# Patient Record
Sex: MALE | Race: WHITE | Employment: FULL TIME | URBAN - METROPOLITAN AREA
[De-identification: names, ages, dates, MRNs, and addresses within clinical notes are randomized per-mention and may not be internally consistent; named-entity substitution may affect disease eponyms.]

---

## 2017-04-03 ENCOUNTER — HOSPITAL ENCOUNTER (EMERGENCY)
Facility: HOSPITAL | Age: 57
Discharge: HOME | End: 2017-04-03
Attending: EMERGENCY MEDICINE
Payer: COMMERCIAL

## 2017-04-03 VITALS
DIASTOLIC BLOOD PRESSURE: 92 MMHG | HEIGHT: 70 IN | WEIGHT: 170 LBS | RESPIRATION RATE: 20 BRPM | TEMPERATURE: 97 F | BODY MASS INDEX: 24.34 KG/M2 | HEART RATE: 70 BPM | SYSTOLIC BLOOD PRESSURE: 131 MMHG | OXYGEN SATURATION: 97 %

## 2017-04-03 DIAGNOSIS — M62.838 TRAPEZIUS MUSCLE SPASM: Primary | ICD-10-CM

## 2017-04-03 PROCEDURE — 80048 BASIC METABOLIC PNL TOTAL CA: CPT

## 2017-04-03 PROCEDURE — 99283 EMERGENCY DEPT VISIT LOW MDM: CPT

## 2017-04-03 PROCEDURE — 93005 ELECTROCARDIOGRAM TRACING: CPT

## 2017-04-03 PROCEDURE — 93010 ELECTROCARDIOGRAM REPORT: CPT | Performed by: EMERGENCY MEDICINE

## 2017-04-03 PROCEDURE — 85025 COMPLETE CBC W/AUTO DIFF WBC: CPT

## 2017-04-03 PROCEDURE — 36415 COLL VENOUS BLD VENIPUNCTURE: CPT

## 2017-04-03 RX ORDER — DIAZEPAM 5 MG/1
5 TABLET ORAL ONCE
Status: COMPLETED | OUTPATIENT
Start: 2017-04-03 | End: 2017-04-03

## 2017-04-03 RX ORDER — LIDOCAINE 50 MG/G
1 PATCH TOPICAL EVERY 24 HOURS
Qty: 7 PATCH | Refills: 0 | Status: SHIPPED | OUTPATIENT
Start: 2017-04-03 | End: 2017-04-10

## 2017-04-03 RX ORDER — LIDOCAINE 50 MG/G
1 PATCH TOPICAL ONCE
Status: DISCONTINUED | OUTPATIENT
Start: 2017-04-03 | End: 2017-04-03

## 2017-04-03 RX ORDER — DIAZEPAM 5 MG/1
5 TABLET ORAL EVERY 8 HOURS PRN
Qty: 15 TABLET | Refills: 0 | Status: SHIPPED | OUTPATIENT
Start: 2017-04-03 | End: 2017-04-08

## 2017-04-03 NOTE — ED PROVIDER NOTES
Patient Seen in: [facility] AND [facility] Emergency Department    History   Patient presents with:  Shoulder Pain  Swelling Edema (cardiovascular, metabolic)    Stated Complaint: right shoulder/neck pain     HPI    63 yo M without PMH presenting for evaluation of air)       Current:/92 mmHg  Pulse 70  Temp(Src) 97.1 °F (36.2 °C)  Resp 20  Ht 177.8 cm (5' 10\")  Wt 77.111 kg  BMI 24.39 kg/m2  SpO2 97%        Physical Exam   Constitutional: No distress. HEENT: MMM. Head: Normocephalic. Neck: Neck supple.  P Impression:  Trapezius muscle spasm  (primary encounter diagnosis)    Disposition:  Discharge    Follow-up:  Clarisse Gómez MD  [address] [place] [id number] [phone]    Schedule an appointment as soon as possible for a visit  For followup an

## 2017-04-03 NOTE — ED NOTES
All discharge instructions including discharge meds and follow up reviewed with patient. Verbalized understanding. . Patient ambulated out of ED in no apparent distress.

## 2017-04-03 NOTE — ED INITIAL ASSESSMENT (HPI)
Patient c/o right sided shoulder and neck pain x 2 weeks, right arm swelling that started today, denies any injury.

## 2018-01-08 ENCOUNTER — HOSPITAL ENCOUNTER (OUTPATIENT)
Age: 58
Discharge: HOME | End: 2018-01-08
Attending: PEDIATRICS
Payer: COMMERCIAL

## 2018-01-08 VITALS
TEMPERATURE: 98 F | RESPIRATION RATE: 16 BRPM | SYSTOLIC BLOOD PRESSURE: 136 MMHG | BODY MASS INDEX: 24.44 KG/M2 | HEART RATE: 76 BPM | OXYGEN SATURATION: 94 % | HEIGHT: 69 IN | WEIGHT: 165 LBS | DIASTOLIC BLOOD PRESSURE: 81 MMHG

## 2018-01-08 DIAGNOSIS — J32.9 SINUSITIS, UNSPECIFIED CHRONICITY, UNSPECIFIED LOCATION: ICD-10-CM

## 2018-01-08 DIAGNOSIS — F17.200 SMOKER: ICD-10-CM

## 2018-01-08 DIAGNOSIS — G44.209 TENSION HEADACHE: Primary | ICD-10-CM

## 2018-01-08 PROCEDURE — 99214 OFFICE O/P EST MOD 30 MIN: CPT

## 2018-01-08 PROCEDURE — 99213 OFFICE O/P EST LOW 20 MIN: CPT

## 2018-01-08 RX ORDER — TRAMADOL HYDROCHLORIDE 50 MG/1
TABLET ORAL EVERY 4 HOURS PRN
Qty: 20 TABLET | Refills: 0 | Status: SHIPPED | OUTPATIENT
Start: 2018-01-08 | End: 2018-01-15

## 2018-01-08 RX ORDER — AMOXICILLIN AND CLAVULANATE POTASSIUM 875; 125 MG/1; MG/1
1 TABLET, FILM COATED ORAL 2 TIMES DAILY
Qty: 20 TABLET | Refills: 0 | Status: SHIPPED | OUTPATIENT
Start: 2018-01-08 | End: 2018-01-18

## 2018-01-08 NOTE — ED PROVIDER NOTES
Patient presents with:  Headache (neurologic)      HPI:     Bishop Lovelace is a 62year old male who presents for evaluation of a chief complaint of headache for 3 days.   Patient is a heavy smoker and has had some congestion and cough over the past week 4 (four) hours as needed for Pain. Dispense:  20 tablet          Refill:  0    Labs performed this visit:  No results found for this or any previous visit (from the past 10 hour(s)). Diagnosis:    ICD-10-CM    1. Tension headache G44.209    2.

## 2018-01-12 ENCOUNTER — OFFICE VISIT (OUTPATIENT)
Dept: INTERNAL MEDICINE CLINIC | Facility: CLINIC | Age: 58
End: 2018-01-12

## 2018-01-12 VITALS
HEIGHT: 70 IN | TEMPERATURE: 97 F | BODY MASS INDEX: 23.62 KG/M2 | DIASTOLIC BLOOD PRESSURE: 73 MMHG | HEART RATE: 68 BPM | SYSTOLIC BLOOD PRESSURE: 108 MMHG | WEIGHT: 165 LBS

## 2018-01-12 DIAGNOSIS — F17.200 TOBACCO DEPENDENCE: ICD-10-CM

## 2018-01-12 DIAGNOSIS — G44.029 CHRONIC CLUSTER HEADACHE, NOT INTRACTABLE: ICD-10-CM

## 2018-01-12 DIAGNOSIS — J01.10 ACUTE FRONTAL SINUSITIS, RECURRENCE NOT SPECIFIED: ICD-10-CM

## 2018-01-12 DIAGNOSIS — G44.89 OTHER HEADACHE SYNDROME: Primary | ICD-10-CM

## 2018-01-12 PROCEDURE — 99212 OFFICE O/P EST SF 10 MIN: CPT | Performed by: INTERNAL MEDICINE

## 2018-01-12 PROCEDURE — 99214 OFFICE O/P EST MOD 30 MIN: CPT | Performed by: INTERNAL MEDICINE

## 2018-01-12 NOTE — PATIENT INSTRUCTIONS
Migraines and Cluster Headaches  Migraines and cluster headaches cause intense, throbbing pain on one side of the head. With a migraine, you may have nausea and vomiting and be sensitive to light and sound.  You may also have warning signs, such as flashi © 9666-6418 [address]. [address]7 [facility] [place], [facility] [place]. All rights reserved. This information is not intended as a substitute for professional medical care. Always follow your healthcare professional's instructions.

## 2018-01-12 NOTE — PROGRESS NOTES
Minh Brunner is a 62year old male.   Patient presents with:  Urgent Care F/u: seen 1/8 headaches treated for sinus infection       HPI:   Pt comes as a new pt   C/c HA   C/o HA x one week , went to UC and was given antibiotics for acute sinusitis   Poi 165 lb (74.8 kg)   BMI 23.68 kg/m²   GENERAL: well developed, well nourished,in no apparent distress  SKIN: no rashes,no suspicious lesions  HEENT: atraumatic, normocephalic,ears -left impacted cerumen , throat - clear, mild left frontal sinus tenderness n

## 2018-01-21 ENCOUNTER — HOSPITAL ENCOUNTER (OUTPATIENT)
Dept: CT IMAGING | Facility: HOSPITAL | Age: 58
Discharge: HOME | End: 2018-01-21
Attending: INTERNAL MEDICINE
Payer: COMMERCIAL

## 2018-01-21 DIAGNOSIS — G44.029 CHRONIC CLUSTER HEADACHE, NOT INTRACTABLE: ICD-10-CM

## 2018-01-21 PROCEDURE — 80053 COMPREHEN METABOLIC PANEL: CPT

## 2018-01-21 PROCEDURE — 85025 COMPLETE CBC W/AUTO DIFF WBC: CPT

## 2018-01-21 PROCEDURE — 70450 CT HEAD/BRAIN W/O DYE: CPT | Performed by: INTERNAL MEDICINE

## 2018-01-21 PROCEDURE — 36415 COLL VENOUS BLD VENIPUNCTURE: CPT

## 2018-01-21 PROCEDURE — 84443 ASSAY THYROID STIM HORMONE: CPT

## 2018-01-21 PROCEDURE — 80061 LIPID PANEL: CPT

## 2018-02-10 ENCOUNTER — OFFICE VISIT (OUTPATIENT)
Dept: INTERNAL MEDICINE CLINIC | Facility: CLINIC | Age: 58
End: 2018-02-10

## 2018-02-10 VITALS
BODY MASS INDEX: 23.05 KG/M2 | HEART RATE: 63 BPM | RESPIRATION RATE: 18 BRPM | SYSTOLIC BLOOD PRESSURE: 128 MMHG | TEMPERATURE: 99 F | WEIGHT: 161 LBS | HEIGHT: 70 IN | DIASTOLIC BLOOD PRESSURE: 85 MMHG

## 2018-02-10 DIAGNOSIS — G44.89 OTHER HEADACHE SYNDROME: Primary | ICD-10-CM

## 2018-02-10 DIAGNOSIS — F17.200 TOBACCO DEPENDENCE: ICD-10-CM

## 2018-02-10 DIAGNOSIS — M54.2 NECK PAIN: ICD-10-CM

## 2018-02-10 PROCEDURE — 99214 OFFICE O/P EST MOD 30 MIN: CPT | Performed by: INTERNAL MEDICINE

## 2018-02-10 PROCEDURE — 99212 OFFICE O/P EST SF 10 MIN: CPT | Performed by: INTERNAL MEDICINE

## 2018-02-10 PROCEDURE — 99406 BEHAV CHNG SMOKING 3-10 MIN: CPT | Performed by: INTERNAL MEDICINE

## 2018-02-10 NOTE — PATIENT INSTRUCTIONS
Reach and Hold Exercise       Do this exercise on your hands and knees. Keep your knees under your hips and your hands under your shoulders. Keep your spine in a neutral position (not arched or sagging). Keep your ears in line with your shoulders.  Hold f What do you want to gain from quitting? Check off some reasons to quit.   Health benefits  ___  Improve my ability to breathe without coughing or shortness of breath  ___  Reduce my risk of lung cancer, heart disease, chronic lung disease  ___  Have fewer w Cigars and pipes are also dangerous. So are smokeless (chewing) tobacco and snuff. All of these products contain nicotine, a highly addictive substance that has harmful effects on your body.  Quitting smoking means giving up all tobacco products.      For m Date Last Reviewed: 2/1/2017  © 3054-4454 [employer] [address]. 1407 [facility], [address] [place]. All rights reserved. This information is not intended as a substitute for professional medical care.  Always follow your healthcare professional' Keep telling yourself you’re no longer a smoker. Don’t lose hope. Most people have tried to quit several times before being successful. Try to stay focused on your plan to be smoke-free. Keep in mind all the benefits of staying quit.  Millions of people hav Nicotine replacement therapy may make quitting easier. Certain aids, such as the nicotine patch, gum, and lozenges, are available without a prescription. It is best to use these under a doctor’s care, though.  The skin patch provides a steady supply of jamshid © 4597-1656 [employer] [address]. 1407 [facility], [facility] [place]. All rights reserved. This information is not intended as a substitute for professional medical care. Always follow your healthcare professional's instructions.         Coping You may notice an increased appetite. Many people who quit smoking gain a few pounds. To limit weight gain, try to watch what you eat. Cut back on fat in your diet. Snack on low-calorie foods, like fresh fruits and vegetables.  Drink low-calorie liquids, es A quit-smoking contract gives you a goal. Write out the contract and sign it. Have it witnessed, if you like. Then keep the contract where you’ll see it often, or carry it with you. Read the contract when you’re tempted to smoke.   Take action  On the day y Date Last Reviewed: 2/1/2017  © 1793-8437 [employer] [address]. 1407 [facility], [address] [place]. All rights reserved. This information is not intended as a substitute for professional medical care.  Always follow your healthcare professional' · E-cigarettes have less toxins than the smoke from a regular cigarette. But the FDA says that these devices may still have substances that can cause cancer. E-cigarettes are not well regulated.  They have not been studied enough to know if they are a good Quitting smoking is a gift to yourself, one of the best things you can do to keep your heart disease from getting worse. Smoking reduces oxygen flow to your heart by speeding the buildup of plaque and changing the health of your blood vessels.  This increas · Ramos Sauer a list of  “quit benefits” in the spot where you smoke.  Put one on the refrigerator and one on your car dashboard.     For more information  · smokefree.gov/uhxv-aa-qx-expert  · [address] [facility] Smoking Quitline: 267-40M-PXYG (841-028-2793)

## 2018-02-10 NOTE — PROGRESS NOTES
Nancie Huang is a 62year old male.   Patient presents with:  Headache: follow up      HPI:   Pt comes for f/u  C/c headaches and has arthritis in the neck   C/o neck pain x 1 1/2 yrs   No falls trauma or injury   HA better after antibiotics   Still smo PLAN:   Diagnoses and all orders for this visit:    Other headache syndrome    Neck pain    Tobacco dependence    advised to quit and gave a lot of reading material print out and went through it with him     Preventative medicine  Reviewed labs from [place]

## 2018-08-03 ENCOUNTER — MOBILE ENCOUNTER (OUTPATIENT)
Dept: INTERNAL MEDICINE CLINIC | Facility: CLINIC | Age: 58
End: 2018-08-03

## 2018-08-03 RX ORDER — AZITHROMYCIN 250 MG/1
TABLET, FILM COATED ORAL
Qty: 6 TABLET | Refills: 0 | Status: SHIPPED | OUTPATIENT
Start: 2018-08-03 | End: 2018-08-21

## 2018-08-08 ENCOUNTER — TELEPHONE (OUTPATIENT)
Dept: INTERNAL MEDICINE CLINIC | Facility: CLINIC | Age: 58
End: 2018-08-08

## 2018-08-08 NOTE — TELEPHONE ENCOUNTER
Patient requesting to see Dr. Tyrone Nunez for follow up to Sinus Infection and requesting to come in 08/20/2018-08/25/2018.        Please advise     Please reply to pool: ISATU Hoyt

## 2018-08-21 ENCOUNTER — HOSPITAL ENCOUNTER (OUTPATIENT)
Dept: GENERAL RADIOLOGY | Facility: HOSPITAL | Age: 58
Discharge: HOME | End: 2018-08-21
Attending: INTERNAL MEDICINE
Payer: COMMERCIAL

## 2018-08-21 ENCOUNTER — OFFICE VISIT (OUTPATIENT)
Dept: INTERNAL MEDICINE CLINIC | Facility: CLINIC | Age: 58
End: 2018-08-21
Payer: COMMERCIAL

## 2018-08-21 VITALS
BODY MASS INDEX: 23.91 KG/M2 | HEIGHT: 70 IN | SYSTOLIC BLOOD PRESSURE: 133 MMHG | RESPIRATION RATE: 16 BRPM | HEART RATE: 62 BPM | WEIGHT: 167 LBS | DIASTOLIC BLOOD PRESSURE: 84 MMHG

## 2018-08-21 DIAGNOSIS — Z00.00 ROUTINE PHYSICAL EXAMINATION: ICD-10-CM

## 2018-08-21 DIAGNOSIS — R05.9 COUGH: ICD-10-CM

## 2018-08-21 DIAGNOSIS — Z12.5 PROSTATE CANCER SCREENING: ICD-10-CM

## 2018-08-21 DIAGNOSIS — R05.9 COUGH: Primary | ICD-10-CM

## 2018-08-21 PROCEDURE — 71046 X-RAY EXAM CHEST 2 VIEWS: CPT | Performed by: INTERNAL MEDICINE

## 2018-08-21 PROCEDURE — 99214 OFFICE O/P EST MOD 30 MIN: CPT | Performed by: INTERNAL MEDICINE

## 2018-08-21 PROCEDURE — 99212 OFFICE O/P EST SF 10 MIN: CPT | Performed by: INTERNAL MEDICINE

## 2018-08-21 RX ORDER — FLUTICASONE PROPIONATE 50 MCG
SPRAY, SUSPENSION (ML) NASAL
Qty: 1 BOTTLE | Refills: 3 | Status: SHIPPED | OUTPATIENT
Start: 2018-08-21 | End: 2018-11-07

## 2018-08-21 NOTE — PROGRESS NOTES
HPI:    Patient ID: Deidra Gramajo is a 62year old male. Cough   This is a recurrent problem. The current episode started more than 1 month ago.  Progression since onset: on and off cough with expectoration since january-treated with augmentin in jan/ Right Ear: External ear normal.   Left Ear: External ear normal.   Nose: Nose normal.   Mouth/Throat: Oropharynx is clear and moist. No oropharyngeal exudate. Eyes: Conjunctivae and EOM are normal. Pupils are equal, round, and reactive to light.    Neck Orders    XR CHEST PA + LAT CHEST (ZOD=63902)      Other Visit Diagnoses     Routine physical examination        Relevant Orders    CBC WITH DIFFERENTIAL WITH PLATELET    COMP METABOLIC PANEL (14)    LIPID PANEL    ASSAY, THYROID STIM HORMONE    URINALYSIS

## 2018-08-21 NOTE — PATIENT INSTRUCTIONS
Problem List Items Addressed This Visit        Unprioritized    Cough - Primary     Intermittent but ongoing persistent cough for at least 6 months. He has had at least 3 courses of antibiotics during this period of time.   He is a chronic smoker–smoked 2

## 2018-08-21 NOTE — ASSESSMENT & PLAN NOTE
Intermittent but ongoing persistent cough for at least 6 months. He has had at least 3 courses of antibiotics during this period of time.   He is a chronic smoker–smoked 2 packs per day for at least 20-25 years and about 1 pack per day for about 15-20 year

## 2018-09-15 ENCOUNTER — LAB ENCOUNTER (OUTPATIENT)
Dept: LAB | Facility: HOSPITAL | Age: 58
End: 2018-09-15
Attending: INTERNAL MEDICINE
Payer: COMMERCIAL

## 2018-09-15 DIAGNOSIS — Z00.00 ROUTINE PHYSICAL EXAMINATION: ICD-10-CM

## 2018-09-15 DIAGNOSIS — Z12.5 PROSTATE CANCER SCREENING: ICD-10-CM

## 2018-09-15 LAB
ALBUMIN SERPL BCP-MCNC: 3.8 G/DL (ref 3.5–4.8)
ALBUMIN/GLOB SERPL: 1 {RATIO} (ref 1–2)
ALP SERPL-CCNC: 88 U/L (ref 32–100)
ALT SERPL-CCNC: 20 U/L (ref 17–63)
ANION GAP SERPL CALC-SCNC: 6 MMOL/L (ref 0–18)
AST SERPL-CCNC: 18 U/L (ref 15–41)
BASOPHILS # BLD: 0 K/UL (ref 0–0.2)
BASOPHILS NFR BLD: 1 %
BILIRUB SERPL-MCNC: 0.6 MG/DL (ref 0.3–1.2)
BILIRUB UR QL: NEGATIVE
BUN SERPL-MCNC: 15 MG/DL (ref 8–20)
BUN/CREAT SERPL: 15.6 (ref 10–20)
CALCIUM SERPL-MCNC: 9.3 MG/DL (ref 8.5–10.5)
CHLORIDE SERPL-SCNC: 106 MMOL/L (ref 95–110)
CHOLEST SERPL-MCNC: 223 MG/DL (ref 110–200)
CLARITY UR: CLEAR
CO2 SERPL-SCNC: 26 MMOL/L (ref 22–32)
COLOR UR: YELLOW
CREAT SERPL-MCNC: 0.96 MG/DL (ref 0.5–1.5)
EOSINOPHIL # BLD: 0.1 K/UL (ref 0–0.7)
EOSINOPHIL NFR BLD: 1 %
ERYTHROCYTE [DISTWIDTH] IN BLOOD BY AUTOMATED COUNT: 13.6 % (ref 11–15)
GLOBULIN PLAS-MCNC: 3.7 G/DL (ref 2.5–3.7)
GLUCOSE SERPL-MCNC: 89 MG/DL (ref 70–99)
GLUCOSE UR-MCNC: NEGATIVE MG/DL
HCT VFR BLD AUTO: 48.2 % (ref 41–52)
HDLC SERPL-MCNC: 43 MG/DL
HGB BLD-MCNC: 16.3 G/DL (ref 13.5–17.5)
HGB UR QL STRIP.AUTO: NEGATIVE
KETONES UR-MCNC: NEGATIVE MG/DL
LDLC SERPL CALC-MCNC: 162 MG/DL (ref 0–99)
LEUKOCYTE ESTERASE UR QL STRIP.AUTO: NEGATIVE
LYMPHOCYTES # BLD: 2.8 K/UL (ref 1–4)
LYMPHOCYTES NFR BLD: 33 %
MCH RBC QN AUTO: 30.6 PG (ref 27–32)
MCHC RBC AUTO-ENTMCNC: 33.8 G/DL (ref 32–37)
MCV RBC AUTO: 90.4 FL (ref 80–100)
MONOCYTES # BLD: 0.8 K/UL (ref 0–1)
MONOCYTES NFR BLD: 9 %
NEUTROPHILS # BLD AUTO: 4.7 K/UL (ref 1.8–7.7)
NEUTROPHILS NFR BLD: 56 %
NITRITE UR QL STRIP.AUTO: NEGATIVE
NONHDLC SERPL-MCNC: 180 MG/DL
OSMOLALITY UR CALC.SUM OF ELEC: 286 MOSM/KG (ref 275–295)
PATIENT FASTING: YES
PH UR: 6 [PH] (ref 5–8)
PLATELET # BLD AUTO: 234 K/UL (ref 140–400)
PMV BLD AUTO: 8.4 FL (ref 7.4–10.3)
POTASSIUM SERPL-SCNC: 3.7 MMOL/L (ref 3.3–5.1)
PROT SERPL-MCNC: 7.5 G/DL (ref 5.9–8.4)
PROT UR-MCNC: NEGATIVE MG/DL
PSA SERPL-MCNC: 1.2 NG/ML (ref 0–4)
RBC # BLD AUTO: 5.33 M/UL (ref 4.5–5.9)
SODIUM SERPL-SCNC: 138 MMOL/L (ref 136–144)
SP GR UR STRIP: 1 (ref 1–1.03)
TRIGL SERPL-MCNC: 92 MG/DL (ref 1–149)
TSH SERPL-ACNC: 1.23 UIU/ML (ref 0.45–5.33)
UROBILINOGEN UR STRIP-ACNC: <2
VIT C UR-MCNC: NEGATIVE MG/DL
WBC # BLD AUTO: 8.4 K/UL (ref 4–11)

## 2018-09-15 PROCEDURE — 36415 COLL VENOUS BLD VENIPUNCTURE: CPT

## 2018-09-15 PROCEDURE — 80053 COMPREHEN METABOLIC PANEL: CPT

## 2018-09-15 PROCEDURE — 81003 URINALYSIS AUTO W/O SCOPE: CPT

## 2018-09-15 PROCEDURE — 84443 ASSAY THYROID STIM HORMONE: CPT

## 2018-09-15 PROCEDURE — 85025 COMPLETE CBC W/AUTO DIFF WBC: CPT

## 2018-09-15 PROCEDURE — 80061 LIPID PANEL: CPT

## 2018-09-19 ENCOUNTER — TELEPHONE (OUTPATIENT)
Dept: INTERNAL MEDICINE CLINIC | Facility: CLINIC | Age: 58
End: 2018-09-19

## 2018-09-21 ENCOUNTER — OFFICE VISIT (OUTPATIENT)
Dept: INTERNAL MEDICINE CLINIC | Facility: CLINIC | Age: 58
End: 2018-09-21
Payer: COMMERCIAL

## 2018-09-21 VITALS
SYSTOLIC BLOOD PRESSURE: 115 MMHG | DIASTOLIC BLOOD PRESSURE: 77 MMHG | WEIGHT: 169 LBS | BODY MASS INDEX: 24.2 KG/M2 | RESPIRATION RATE: 16 BRPM | HEIGHT: 70 IN | HEART RATE: 65 BPM

## 2018-09-21 DIAGNOSIS — Z72.0 TOBACCO ABUSE: Primary | ICD-10-CM

## 2018-09-21 DIAGNOSIS — E78.5 HYPERLIPIDEMIA, UNSPECIFIED HYPERLIPIDEMIA TYPE: ICD-10-CM

## 2018-09-21 PROCEDURE — 99214 OFFICE O/P EST MOD 30 MIN: CPT | Performed by: INTERNAL MEDICINE

## 2018-09-21 PROCEDURE — 99212 OFFICE O/P EST SF 10 MIN: CPT | Performed by: INTERNAL MEDICINE

## 2018-09-21 RX ORDER — ROSUVASTATIN CALCIUM 5 MG/1
5 TABLET, COATED ORAL NIGHTLY
Qty: 30 TABLET | Refills: 3 | Status: SHIPPED | OUTPATIENT
Start: 2018-09-21 | End: 2018-11-07

## 2018-09-21 NOTE — PROGRESS NOTES
HPI:    Patient ID: Velasquez Ballard is a 62year old male.     Chest x-ray completed recently looks normal.  Labs completed recently–PSA looks normal.  Renal functions and liver functions look normal.  Lipid panel shows persistent elevation in the LDL chol relief. Review of Systems   Constitutional: Negative. Negative for chills. HENT: Negative. Negative for rhinorrhea. Eyes: Negative. Respiratory: Positive for cough. Negative for hemoptysis. Cardiovascular: Negative.   Negative for chest p has normal reflexes. Skin: Skin is warm and dry. Nursing note and vitals reviewed.              ASSESSMENT/PLAN:     Problem List Items Addressed This Visit        Unprioritized    Tobacco abuse - Primary     2 ppd x 20 yrs and then1 ppd x 10 yrs-@ 50 p

## 2018-09-21 NOTE — ASSESSMENT & PLAN NOTE
2 ppd x 20 yrs and then1 ppd x 10 yrs-@ 50 pack yr smoking hx    Chest x-ray with bronchitis did not show any significant abnormalities. CT low-dose screening study will be ordered.     Had an extensive discussion about abstinence from smoking and trying t

## 2018-09-21 NOTE — PATIENT INSTRUCTIONS
Problem List Items Addressed This Visit        Unprioritized    Hyperlipidemia     Lipid panel shows persistent elevation in the LDL cholesterol. Advised to start on Crestor 5 mg 1 tablet once daily. Recheck labs in 6 weeks.          Relevant Medications

## 2018-09-21 NOTE — ASSESSMENT & PLAN NOTE
Lipid panel shows persistent elevation in the LDL cholesterol. Advised to start on Crestor 5 mg 1 tablet once daily. Recheck labs in 6 weeks.

## 2018-10-08 ENCOUNTER — HOSPITAL ENCOUNTER (OUTPATIENT)
Dept: CT IMAGING | Facility: HOSPITAL | Age: 58
Discharge: HOME | End: 2018-10-08
Attending: INTERNAL MEDICINE
Payer: COMMERCIAL

## 2018-10-08 DIAGNOSIS — Z72.0 TOBACCO ABUSE: ICD-10-CM

## 2018-10-19 ENCOUNTER — TELEPHONE (OUTPATIENT)
Dept: INTERNAL MEDICINE CLINIC | Facility: CLINIC | Age: 58
End: 2018-10-19

## 2018-10-19 DIAGNOSIS — Z72.0 TOBACCO ABUSE: ICD-10-CM

## 2018-10-22 RX ORDER — VARENICLINE TARTRATE 1 MG/1
1 TABLET, FILM COATED ORAL 2 TIMES DAILY
Qty: 1 PACKAGE | Refills: 0 | Status: SHIPPED | OUTPATIENT
Start: 2018-10-22 | End: 2018-11-07

## 2018-10-22 NOTE — TELEPHONE ENCOUNTER
Patients wife calling in stating patient ran out of chantix on Friday and urgently needs a refill. Dr. Tyrone Nunez, patient had starter pack ordered but needs a continuing packet. Script pending. Please advise.

## 2018-10-22 NOTE — TELEPHONE ENCOUNTER
Received verbal order from Dr. Cassie Miller to refill chantix continuing pack. Attempted to contact patient's wife but phone rang then went to busy. LMTCB with patient voicemail to inform script was sent in.

## 2018-10-22 NOTE — TELEPHONE ENCOUNTER
Per pt's wife is calling to f/u on refill request. pls advise. Thank you  She is requesting to speak to a RN now. I tried to transfer but the phones froze. pls advise.

## 2018-10-29 RX ORDER — VARENICLINE TARTRATE 1 MG/1
1 TABLET, FILM COATED ORAL 2 TIMES DAILY
Qty: 60 TABLET | Refills: 6 | Status: SHIPPED | OUTPATIENT
Start: 2018-10-29 | End: 2018-11-07

## 2018-11-05 ENCOUNTER — LAB ENCOUNTER (OUTPATIENT)
Dept: LAB | Facility: HOSPITAL | Age: 58
End: 2018-11-05
Attending: INTERNAL MEDICINE
Payer: COMMERCIAL

## 2018-11-05 DIAGNOSIS — E78.5 HYPERLIPIDEMIA, UNSPECIFIED HYPERLIPIDEMIA TYPE: ICD-10-CM

## 2018-11-05 PROCEDURE — 85025 COMPLETE CBC W/AUTO DIFF WBC: CPT

## 2018-11-05 PROCEDURE — 36415 COLL VENOUS BLD VENIPUNCTURE: CPT

## 2018-11-05 PROCEDURE — 80053 COMPREHEN METABOLIC PANEL: CPT

## 2018-11-05 PROCEDURE — 80061 LIPID PANEL: CPT

## 2018-11-07 ENCOUNTER — OFFICE VISIT (OUTPATIENT)
Dept: INTERNAL MEDICINE CLINIC | Facility: CLINIC | Age: 58
End: 2018-11-07
Payer: COMMERCIAL

## 2018-11-07 VITALS
BODY MASS INDEX: 24.91 KG/M2 | SYSTOLIC BLOOD PRESSURE: 129 MMHG | HEART RATE: 54 BPM | WEIGHT: 174 LBS | RESPIRATION RATE: 16 BRPM | HEIGHT: 70 IN | DIASTOLIC BLOOD PRESSURE: 78 MMHG

## 2018-11-07 DIAGNOSIS — J43.2 CENTRILOBULAR EMPHYSEMA (HCC): ICD-10-CM

## 2018-11-07 DIAGNOSIS — Z72.0 TOBACCO ABUSE: ICD-10-CM

## 2018-11-07 DIAGNOSIS — E78.5 HYPERLIPIDEMIA, UNSPECIFIED HYPERLIPIDEMIA TYPE: Primary | ICD-10-CM

## 2018-11-07 PROCEDURE — 99214 OFFICE O/P EST MOD 30 MIN: CPT | Performed by: INTERNAL MEDICINE

## 2018-11-07 PROCEDURE — 99212 OFFICE O/P EST SF 10 MIN: CPT | Performed by: INTERNAL MEDICINE

## 2018-11-07 NOTE — ASSESSMENT & PLAN NOTE
Lipid panel and liver function test looked stable on Crestor at 5 mg 1 tablet once daily. He has tolerated his medications well without any significant side effects.   Recheck labs in about 4-6 months

## 2018-11-07 NOTE — ASSESSMENT & PLAN NOTE
Patient has a history of smoking about 2 packs/day for 20 years and then 1 pack/day for about 10 years–about a total of 50 pack/year of smoking history. Chest x-ray did show bronchitis without other significant abnormalities.   CT scan low-dose showed 3 sm

## 2018-11-07 NOTE — PROGRESS NOTES
HPI:    Patient ID: Kalee Mckeon is a 62year old male. Labs looked great. Hyperlipidemia   This is a recurrent problem. The current episode started more than 1 month ago. The problem is uncontrolled.  Recent lipid tests were reviewed and a and reactive to light. Neck: Normal range of motion. Neck supple. No JVD present. No thyromegaly present. Cardiovascular: Normal rate, regular rhythm, normal heart sounds and intact distal pulses.    Pulmonary/Chest: Effort normal and breath sounds norm and tracheal lymph nodes. Patient did have a bad episode of bronchitis during the time of testing. He is advised to repeat the CT scan by October 2019. Pulmonary function tests have been ordered today.   Patient has quit smoking with Chantix about 2 week

## 2018-11-07 NOTE — PATIENT INSTRUCTIONS
Problem List Items Addressed This Visit        Unprioritized    Centrilobular emphysema ([address]     Centrilobular emphysema with panlobular features in addition.   Bilateral basal reticular opacities most likely representing atelectasis or scarring as per the

## 2018-11-07 NOTE — ASSESSMENT & PLAN NOTE
Centrilobular emphysema with panlobular features in addition. Bilateral basal reticular opacities most likely representing atelectasis or scarring as per the CAT scan done recently.   Additionally he does have some scattered subcarinal and tracheal lymph n

## 2018-12-19 ENCOUNTER — HOSPITAL ENCOUNTER (OUTPATIENT)
Dept: RESPIRATORY THERAPY | Facility: HOSPITAL | Age: 58
Discharge: HOME | End: 2018-12-19
Attending: INTERNAL MEDICINE
Payer: COMMERCIAL

## 2018-12-19 DIAGNOSIS — Z72.0 TOBACCO ABUSE: ICD-10-CM

## 2018-12-19 DIAGNOSIS — J43.2 CENTRILOBULAR EMPHYSEMA (HCC): ICD-10-CM

## 2018-12-19 DIAGNOSIS — Z72.0 TOBACCO USE: ICD-10-CM

## 2018-12-19 DIAGNOSIS — J43.2 CENTRIACINAR EMPHYSEMA (HCC): ICD-10-CM

## 2018-12-19 PROCEDURE — 94060 EVALUATION OF WHEEZING: CPT | Performed by: INTERNAL MEDICINE

## 2018-12-19 PROCEDURE — 94729 DIFFUSING CAPACITY: CPT | Performed by: INTERNAL MEDICINE

## 2018-12-19 PROCEDURE — 94726 PLETHYSMOGRAPHY LUNG VOLUMES: CPT | Performed by: INTERNAL MEDICINE

## 2018-12-26 NOTE — PROCEDURES
Pulmonary Function Test     Adriana Franco Patient Status:  Outpatient    1960 MRN [id number]   Date of Exam  PCP Ivania Wild MD           Spirometry   FEV1:330 90%  FVC:4.56 96%  FEV1/FVC:0.72    Lung Volume   T.27 124%  RV

## 2019-01-25 NOTE — ADDENDUM NOTE
Encounter addended by: Kate Anglin LPN on: 9/22/6939 1:66 AM   Actions taken: Letter saved, Result note filed

## 2019-11-05 ENCOUNTER — APPOINTMENT (OUTPATIENT)
Dept: MRI IMAGING | Facility: HOSPITAL | Age: 59
End: 2019-11-05
Attending: Other
Payer: COMMERCIAL

## 2019-11-05 ENCOUNTER — APPOINTMENT (OUTPATIENT)
Dept: CT IMAGING | Facility: HOSPITAL | Age: 59
End: 2019-11-05
Attending: EMERGENCY MEDICINE
Payer: COMMERCIAL

## 2019-11-05 ENCOUNTER — HOSPITAL ENCOUNTER (OUTPATIENT)
Facility: HOSPITAL | Age: 59
Setting detail: OBSERVATION
Discharge: HOME | End: 2019-11-06
Attending: EMERGENCY MEDICINE | Admitting: HOSPITALIST
Payer: COMMERCIAL

## 2019-11-05 ENCOUNTER — APPOINTMENT (OUTPATIENT)
Dept: GENERAL RADIOLOGY | Facility: HOSPITAL | Age: 59
End: 2019-11-05
Attending: EMERGENCY MEDICINE
Payer: COMMERCIAL

## 2019-11-05 ENCOUNTER — APPOINTMENT (OUTPATIENT)
Dept: ULTRASOUND IMAGING | Facility: HOSPITAL | Age: 59
End: 2019-11-05
Attending: Other
Payer: COMMERCIAL

## 2019-11-05 DIAGNOSIS — R55 SYNCOPE AND COLLAPSE: Primary | ICD-10-CM

## 2019-11-05 PROCEDURE — 95816 EEG AWAKE AND DROWSY: CPT | Performed by: OTHER

## 2019-11-05 PROCEDURE — 99219 INITIAL OBSERVATION CARE,LEVL II: CPT | Performed by: HOSPITALIST

## 2019-11-05 PROCEDURE — 93268 ECG RECORD/REVIEW: CPT | Performed by: INTERNAL MEDICINE

## 2019-11-05 PROCEDURE — 72125 CT NECK SPINE W/O DYE: CPT | Performed by: EMERGENCY MEDICINE

## 2019-11-05 PROCEDURE — 93880 EXTRACRANIAL BILAT STUDY: CPT | Performed by: OTHER

## 2019-11-05 PROCEDURE — 70553 MRI BRAIN STEM W/O & W/DYE: CPT | Performed by: OTHER

## 2019-11-05 PROCEDURE — 99204 OFFICE O/P NEW MOD 45 MIN: CPT | Performed by: OTHER

## 2019-11-05 PROCEDURE — 70450 CT HEAD/BRAIN W/O DYE: CPT | Performed by: EMERGENCY MEDICINE

## 2019-11-05 PROCEDURE — 71045 X-RAY EXAM CHEST 1 VIEW: CPT | Performed by: EMERGENCY MEDICINE

## 2019-11-05 RX ORDER — ASPIRIN 81 MG/1
324 TABLET, CHEWABLE ORAL ONCE
Status: COMPLETED | OUTPATIENT
Start: 2019-11-05 | End: 2019-11-05

## 2019-11-05 RX ORDER — ASPIRIN 300 MG
300 SUPPOSITORY, RECTAL RECTAL DAILY
Status: DISCONTINUED | OUTPATIENT
Start: 2019-11-06 | End: 2019-11-06

## 2019-11-05 RX ORDER — ASPIRIN 325 MG
325 TABLET ORAL DAILY
Status: DISCONTINUED | OUTPATIENT
Start: 2019-11-06 | End: 2019-11-06

## 2019-11-05 RX ORDER — SODIUM CHLORIDE 9 MG/ML
INJECTION, SOLUTION INTRAVENOUS CONTINUOUS
Status: DISCONTINUED | OUTPATIENT
Start: 2019-11-05 | End: 2019-11-06

## 2019-11-05 RX ORDER — POTASSIUM CHLORIDE 20 MEQ/1
40 TABLET, EXTENDED RELEASE ORAL EVERY 4 HOURS
Status: COMPLETED | OUTPATIENT
Start: 2019-11-05 | End: 2019-11-06

## 2019-11-05 RX ORDER — MAGNESIUM OXIDE 400 MG (241.3 MG MAGNESIUM) TABLET
400 TABLET ONCE
Status: COMPLETED | OUTPATIENT
Start: 2019-11-05 | End: 2019-11-05

## 2019-11-05 RX ORDER — ACETAMINOPHEN 325 MG/1
650 TABLET ORAL EVERY 4 HOURS PRN
Status: DISCONTINUED | OUTPATIENT
Start: 2019-11-05 | End: 2019-11-06

## 2019-11-05 RX ORDER — ACETAMINOPHEN 650 MG/1
650 SUPPOSITORY RECTAL EVERY 4 HOURS PRN
Status: DISCONTINUED | OUTPATIENT
Start: 2019-11-05 | End: 2019-11-06

## 2019-11-05 RX ORDER — SENNOSIDES 8.6 MG
17.2 TABLET ORAL NIGHTLY
Status: DISCONTINUED | OUTPATIENT
Start: 2019-11-05 | End: 2019-11-06

## 2019-11-05 RX ORDER — NICOTINE 21 MG/24HR
1 PATCH, TRANSDERMAL 24 HOURS TRANSDERMAL DAILY
Status: DISCONTINUED | OUTPATIENT
Start: 2019-11-05 | End: 2019-11-06

## 2019-11-05 NOTE — ED NOTES
2ndTrop and ekg completed, tele box placed, aspirin given ok to move to [facility] per Dr. Herbert Brochure

## 2019-11-05 NOTE — ED NOTES
61year old male here with syncopal episode, pt reports was standing this am and \" woke up sitting against cabinet\", smoker, reports neck pain and back pain

## 2019-11-05 NOTE — ED NOTES
Pt with syncopal episodes, states he was standing and next thing he \"woke up\" sitting against a cabinet. C/o neck and back pain.

## 2019-11-05 NOTE — CONSULTS
[facility]iology [facility]    [address] [facility] Location: [facility] 1W    1960 MRN [id number]   Consulting Date 2019 CSN [id number]   Consulting Physician Galo Mohan MD Attending Physician Dina Bailey heartburn  : no dysuria or hematuria  NEURO: denies focal weaknesses or paresthesias    Physical Exam:  Vital Signs: /90 (BP Location: Right arm)   Pulse 58   Temp 97.9 °F (36.6 °C) (Temporal)   Resp 20   Ht 70\"   Wt 172 lb 1.6 oz (78.1 kg)   SpO2

## 2019-11-05 NOTE — CONSULTS
[facility] - [facility]    Report of Consultation     Erica Jones Valeriano Patient Status:  Observation    1960 MRN [id number]   Location [facility] 1W Attending Robert Lozano MD   Hosp Day # 0 PCP Naga Washington MD     Date of Admis Yes    Comment:Daily; coffee, soda, greater than six cups    Social History Narrative    None on file            Current Medications:  0.9% NaCl infusion, , Intravenous, Continuous  acetaminophen (TYLENOL) tab 650 mg, 650 mg, Oral, Q4H PRN    Or  acetamino Exam:  Muscle tone normal  No atrophy or fasciculations  Strength- upper extremities 5/5 proximally and distally                  - lower  extremities 5/5 proximally and distally    Sensory Exam:  Light touch sensation- intact in all 4 extremities    Deep Multilevel disc degeneration, particularly at C5-C6. 3. Centrilobular and paraseptal emphysema is partially visualized. 4. Lesser incidental findings as above.     Dictated by (CST): Lance Dandy, MD on 11/05/2019 at 9:50     Approved by (CST): Bernard Cabrera

## 2019-11-05 NOTE — ED PROVIDER NOTES
Patient Seen in: [facility] Emergency Department      History   Patient presents with:  Syncope (cardiovascular, neurologic)    Stated Complaint: syncope    HPI    Patient presents the emergency department complaining of syncope.   He states that h Physical Exam  Vitals signs and nursing note reviewed. Constitutional:       General: He is not in acute distress. Appearance: He is well-developed. HENT:      Head: Normocephalic.    Eyes:      Conjunctiva/sclera: Conjunctivae normal.   Nec ------                     CBC W/ DIFFERENTIAL[[id number]]                              Final result                 Please view results for these tests on the individual orders.    RAINBOW DRAW BLUE   RAINBOW DRAW LAVENDER   RAINBOW DRAW LIGHT GREEN   RAINB 11/05/2019 at 10:14            Radiology exams  Viewed and reviewed by myself and findings discussed with patient including need for follow up    Admission disposition: 11/5/2019 11:16 AM                   Disposition and Plan     Clinical Impression:  Syn

## 2019-11-05 NOTE — H&P
[facility] Patient Status:  Observation    1960 MRN [id number]   Location [facility] 1W Attending Bouchra Burgess., MD   Hosp Day # 0 PCP July Hernández MD     Date:  2019 admission. Review of Systems:     A comprehensive 12 point review of systems was completed. Pertinent positives and negatives noted in the the HPI.     Physical Exam:   Vital signs: Blood pressure 117/67, pulse 57, temperature 97.7 °F (36.5 °C), tempe No acute intracranial hemorrhage. No acute intracranial CT abnormalities.     Dictated by (CST): Ludwin Yin MD on 11/05/2019 at 9:44     Approved by (CST): Ludwin Yin MD on 11/05/2019 at 9:56          Ct Spine Cervical (cpt=72125)    Resu workup    Marva Inman.  Waldo Curling, MD  11/5/2019

## 2019-11-06 ENCOUNTER — ANCILLARY PROCEDURE (OUTPATIENT)
Dept: CARDIOLOGY | Age: 59
End: 2019-11-06
Attending: INTERNAL MEDICINE

## 2019-11-06 ENCOUNTER — APPOINTMENT (OUTPATIENT)
Dept: CV DIAGNOSTICS | Facility: HOSPITAL | Age: 59
End: 2019-11-06
Attending: Other
Payer: COMMERCIAL

## 2019-11-06 VITALS
HEART RATE: 69 BPM | SYSTOLIC BLOOD PRESSURE: 120 MMHG | TEMPERATURE: 98 F | BODY MASS INDEX: 24.36 KG/M2 | OXYGEN SATURATION: 97 % | HEIGHT: 70 IN | WEIGHT: 170.13 LBS | DIASTOLIC BLOOD PRESSURE: 72 MMHG | RESPIRATION RATE: 18 BRPM

## 2019-11-06 DIAGNOSIS — R55 SYNCOPE, UNSPECIFIED SYNCOPE TYPE: ICD-10-CM

## 2019-11-06 DIAGNOSIS — R55 SYNCOPE, UNSPECIFIED SYNCOPE TYPE: Primary | ICD-10-CM

## 2019-11-06 LAB
ABSOLUTE IMMATURE GRANULOCYTES (OFFPRE24): NORMAL
ALBUMIN SERPL-MCNC: 3.4 G/DL
ALBUMIN/GLOB SERPL: NORMAL {RATIO}
ANION GAP SERPL CALC-SCNC: 2 MMOL/L
BASO+EOS+MONOS # BLD: NORMAL 10*3/UL
BASO+EOS+MONOS NFR BLD: NORMAL %
BASOPHILS # BLD: NORMAL 10*3/UL
BASOPHILS NFR BLD: NORMAL %
BUN SERPL-MCNC: 13 MG/DL
BUN/CREAT SERPL: 13.8
CALCIUM SERPL-MCNC: 8.4 MG/DL
CHLORIDE SERPL-SCNC: 112 MMOL/L
CHOLEST SERPL-MCNC: 224 MG/DL
CHOLEST/HDLC SERPL: NORMAL {RATIO}
CO2 SERPL-SCNC: 28 MMOL/L
CREAT SERPL-MCNC: 0.94 MG/DL
DIFFERENTIAL METHOD BLD: NORMAL
EOSINOPHIL # BLD: NORMAL 10*3/UL
EOSINOPHIL NFR BLD: NORMAL %
ERYTHROCYTE [DISTWIDTH] IN BLOOD: NORMAL %
GLOBULIN SER-MCNC: NORMAL G/DL
GLUCOSE SERPL-MCNC: 81 MG/DL
HCT VFR BLD CALC: 46.3 %
HDLC SERPL-MCNC: 34 MG/DL
HGB BLD-MCNC: 16.2 G/DL
IMMATURE GRANULOCYTES (OFFPRE25): NORMAL
LDLC SERPL CALC-MCNC: 156 MG/DL
LENGTH OF FAST TIME PATIENT: NORMAL H
LENGTH OF FAST TIME PATIENT: NORMAL H
LYMPHOCYTES # BLD: NORMAL 10*3/UL
LYMPHOCYTES NFR BLD: NORMAL %
MCH RBC QN AUTO: NORMAL PG
MCHC RBC AUTO-ENTMCNC: NORMAL G/DL
MCV RBC AUTO: NORMAL FL
MONOCYTES # BLD: NORMAL 10*3/UL
MONOCYTES NFR BLD: NORMAL %
MPV (OFFPRE2): NORMAL
NEUTROPHILS # BLD: NORMAL 10*3/UL
NEUTROPHILS NFR BLD: NORMAL %
NONHDLC SERPL-MCNC: 190 MG/DL
NRBC BLD MANUAL-RTO: NORMAL %
PHOSPHATE SERPL-MCNC: 1.5 MG/DL
PLAT MORPH BLD: NORMAL
PLATELET # BLD: 171 10*3/UL
POTASSIUM SERPL-SCNC: 4.4 MMOL/L
RBC # BLD: 5.11 10*6/UL
RBC MORPH BLD: NORMAL
SODIUM SERPL-SCNC: 142 MMOL/L
TRIGL SERPL-MCNC: 172 MG/DL
TSH SERPL-ACNC: 1.82 M[IU]/L
VLDLC SERPL CALC-MCNC: 34 MG/DL
WBC # BLD: 4.5 10*3/UL
WBC MORPH BLD: NORMAL

## 2019-11-06 PROCEDURE — 93306 TTE W/DOPPLER COMPLETE: CPT | Performed by: OTHER

## 2019-11-06 PROCEDURE — 99217 OBSERVATION CARE DISCHARGE: CPT | Performed by: HOSPITALIST

## 2019-11-06 PROCEDURE — 99214 OFFICE O/P EST MOD 30 MIN: CPT | Performed by: OTHER

## 2019-11-06 RX ORDER — ATORVASTATIN CALCIUM 20 MG/1
20 TABLET, FILM COATED ORAL NIGHTLY
Qty: 30 TABLET | Refills: 0 | Status: SHIPPED | OUTPATIENT
Start: 2019-11-06 | End: 2019-11-15

## 2019-11-06 RX ORDER — ASPIRIN 81 MG/1
81 TABLET ORAL DAILY
Qty: 30 TABLET | Refills: 0 | Status: SHIPPED | OUTPATIENT
Start: 2019-11-06

## 2019-11-06 RX ORDER — ATORVASTATIN CALCIUM 20 MG/1
20 TABLET, FILM COATED ORAL NIGHTLY
Status: DISCONTINUED | OUTPATIENT
Start: 2019-11-06 | End: 2019-11-06

## 2019-11-06 RX ORDER — NICOTINE 21 MG/24HR
1 PATCH, TRANSDERMAL 24 HOURS TRANSDERMAL DAILY
Qty: 10 PATCH | Refills: 0 | Status: SHIPPED | OUTPATIENT
Start: 2019-11-07 | End: 2019-11-11

## 2019-11-06 NOTE — PROCEDURES
EEG report    REFERRING PHYSICIAN: Ramesh Nuñez., MD    PCP and phone number:  Maria Del Carmen Calderon MD  [phone]    TECHNIQUE: 21 channels of EEG, 2 channels of EOG, and 1 channel of EKG were recorded utilizing the International 10/20 System.  The vanessa

## 2019-11-06 NOTE — SLP NOTE
SLP attempt for BSSE. Pt out of room for ECHO. SLP to f/u as schedule permits. Thank you.      Lovely Mckoy M.S. [address] [facility]  Speech Language Pathologist   Phone Number [phone]

## 2019-11-06 NOTE — SLP NOTE
ADULT SWALLOWING EVALUATION    ASSESSMENT    ASSESSMENT/OVERALL IMPRESSION:  SLP BSSE orders received and acknowledged. A swallow evaluation warranted d/t stroke protocol. Pt denies any difficulties swallowing.  Pt tolerating a regular diet and thin liquids spouse  Diet Prior to Admission: Regular; Thin liquids  Precautions: Aspiration    Patient/Family Goals: Assess diet     SWALLOWING HISTORY  Current Diet Consistency: Regular; Thin liquids  Dysphagia History: None at [address] [place] [address]   Imaging Results:   11/5/19 CXR  === Limits    Pharyngeal Phase of Swallow: Within Functional Limits  (Please note: Silent aspiration cannot be evaluated clinically.  Videofluoroscopic Swallow Study is required to rule-out silent aspiration.)    Esophageal Phase of Swallow: No complaints consi

## 2019-11-06 NOTE — PLAN OF CARE
\"Rojas\" denies complaints, neuro workup WNL so far. Still await 2d echo. Cardiology added atorvastatin (see lipid results). Possible discharge later today if 2d echo WNL.      Problem: Patient Centered Care  Goal: Patient preferences are identified and int Remains free of injury related to seizure activity  Description  INTERVENTIONS:  - Maintain airway, patient safety  and administer oxygen as ordered  - Monitor patient for seizure activity, document and report duration and description of seizure to MD/LIP

## 2019-11-06 NOTE — PHYSICAL THERAPY NOTE
PHYSICAL THERAPY EVALUATION - INPATIENT     Room Number: 549/583-W  Evaluation Date: 11/6/2019  Type of Evaluation: Initial   Physician Order: PT Eval and Treat       Reason for Therapy: Mobility Dysfunction and Discharge Planning    PHYSICAL THERAPY ALESSANDRA OF MOTION AND STRENGTH ASSESSMENT  Upper extremity ROM and strength are within functional limits     Lower extremity ROM is within functional limits     Lower extremity strength is within functional limits     BALANCE  Static Sitting: Good  Dynamic Sitting

## 2019-11-06 NOTE — PLAN OF CARE
Mr. Nasima Alvarez is discharged to home with plan to  event monitor at [facility] office immediately following discharge. Mr. Nasima Alvarez verbalized understanding of his discharge instructions and follow up plan.     Problem: Patient Centered Care  Goal: Patient pr parameters to optimize cerebral perfusion and minimize risk of hemorrhage  - Monitor temperature, glucose, and sodium.  Initiate appropriate interventions as ordered  11/6/2019 1422 by Salima Perdue RN  Outcome: Adequate for Discharge  11/6/2019 1052 b

## 2019-11-06 NOTE — PROGRESS NOTES
[facility]    Cardiology Progress Note    [address] Patient Status:  Observation    1960 MRN [id number]   Location [facility]5W Attending Emil Matos MD   Hosp Day # 0 PCP Rashi Denton MD     Primary Card 6 weeks in clinic after EM      Results:     Lab Results   Component Value Date    WBC 4.5 11/06/2019    HGB 16.2 11/06/2019    HCT 46.3 11/06/2019    .0 11/06/2019    CREATSERUM 0.94 11/06/2019    BUN 13 11/06/2019     11/06/2019    K 4.4 11/ Nonspecific signal abnormalities in the white matter. These are favored to represent sequela of chronic microvascular ischemic disease, particularly if the patient has a long-standing history of diabetes and/or hypertension.  Less likely differential diagno Cardiology  11/06/19

## 2019-11-06 NOTE — PROGRESS NOTES
[facility] AND [facility]  Neurology Progress Note    Liborio Cabezas Patient Status:  Observation    1960 MRN [id number]   Location [facility]5W Attending Spencer Burrows MD   Hosp Day # 0 PCP Roz Kwok MD     Subjective:  Cristina Amezcua intact  XII.  Tongue is midline    Motor Exam:  Muscle tone normal  No atrophy or fasciculations  Strength- upper extremities 5/5 proximally and distally  Rapid alternating movements intact      Lab Results   Component Value Date    WBC 4.5 11/06/2019    HG process is identified. 2. Minimal chronic encephalomalacia is suggested in the posterior left temporal lobe, adjacent to the left petrous apex. 3. Nonspecific signal abnormalities in the white matter.  These are favored to represent sequela of chronic claudette significant changes have occurred Electronically signed on 11/05/2019 at 09:20 by Fish Cuevas MD    MRI of the brain was independently reviewed, the same minimal stimulation in the left hemisphere noted    Assessment:  Patient Active Problem List:

## 2019-11-06 NOTE — DISCHARGE SUMMARY
[facility]    Discharge Summary     [facility] Patient Status:  Observation    1960 MRN [id number]   Location [facility]5W Attending Anthony Bennett MD   Hosp Day # 0 PCP Heather Troncoso MD     Date of Admission ground about 2 minutes later feeling confused and having pain in his neck and head. He denies any preceding chest pain, light headedness, sob or blurry vision. He has never had such an episode before.  Of note he chronically has some tinitus due to being in Geno Mack MD  In 6 weeks    [address]  [place] [address]    Grace Lang MD  In 1 week    [address] [facility]  Jocelyn Hutson  11/6/2019

## 2019-11-06 NOTE — PLAN OF CARE
Problem: Patient Centered Care  Goal: Patient preferences are identified and integrated in the patient's plan of care  Description  Interventions:  - What would you like us to know as we care for you?  Does not take any home meds   - Provide timely, compl document and report duration and description of seizure to MD/LIP  - If seizure occurs, turn patient to side and suction secretions as needed  - Reorient patient post seizure  - Seizure pads on all 4 side rails  - Instruct patient/family to notify RN of an

## 2019-11-07 ENCOUNTER — TELEPHONE (OUTPATIENT)
Dept: INTERNAL MEDICINE CLINIC | Facility: CLINIC | Age: 59
End: 2019-11-07

## 2019-11-07 ENCOUNTER — TELEPHONE (OUTPATIENT)
Dept: INTERNAL MEDICINE UNIT | Facility: HOSPITAL | Age: 59
End: 2019-11-07

## 2019-11-07 ENCOUNTER — PATIENT OUTREACH (OUTPATIENT)
Dept: CASE MANAGEMENT | Age: 59
End: 2019-11-07

## 2019-11-07 DIAGNOSIS — R55 SYNCOPE AND COLLAPSE: ICD-10-CM

## 2019-11-07 DIAGNOSIS — Z02.9 ENCOUNTERS FOR UNSPECIFIED ADMINISTRATIVE PURPOSE: ICD-10-CM

## 2019-11-07 PROCEDURE — 1111F DSCHRG MED/CURRENT MED MERGE: CPT

## 2019-11-07 NOTE — TELEPHONE ENCOUNTER
Return to work letter provided to patient as directed by Dr iL Gross. Pt able to return to work on Monday 11/11/19 with no restrictions.

## 2019-11-07 NOTE — TELEPHONE ENCOUNTER
Spoke to pt for TCM today. Pt does not have HFU appt scheduled at this time as he declined to schedule because his wife was recently admitted and he prefers to have both their appts around the same day/time if possible.   TCM/HFU appt recommended by 11-13-

## 2019-11-07 NOTE — PROGRESS NOTES
Initial Post Discharge Follow Up   Discharge Date: 11/6/19  Contact Date: 11/7/2019    Consent Verification:  Assessment Completed With: Patient  HIPAA Verified?   Yes    Discharge Dx:  Vasovagal syncope    General:   • How have you been since your disch prescribed:    o Was the new medication’s purpose & side effects reviewed? yes  o Do you have any questions about your new medication?  No  • Did you  your discharge medications when you left the hospital? Yes  • May I go over your medications with y reviewed/discussed/and reconciled against outpatient medications with patient,  and orders reviewed and discussed. Any changes or updates to medications and or orders sent to PCP.

## 2019-11-08 ENCOUNTER — TELEPHONE (OUTPATIENT)
Dept: CARDIOLOGY | Age: 59
End: 2019-11-08

## 2019-11-11 ENCOUNTER — TELEPHONE (OUTPATIENT)
Dept: INTERNAL MEDICINE CLINIC | Facility: CLINIC | Age: 59
End: 2019-11-11

## 2019-11-12 RX ORDER — NICOTINE 21 MG/24HR
1 PATCH, TRANSDERMAL 24 HOURS TRANSDERMAL DAILY
Qty: 14 PATCH | Refills: 0 | Status: SHIPPED | OUTPATIENT
Start: 2019-11-12 | End: 2019-11-15

## 2019-11-12 NOTE — TELEPHONE ENCOUNTER
Per pharmacy the patches come in sealed boxes of 14. Current rx is to dispense 10 patches. Please advise.

## 2019-11-15 ENCOUNTER — OFFICE VISIT (OUTPATIENT)
Dept: INTERNAL MEDICINE CLINIC | Facility: CLINIC | Age: 59
End: 2019-11-15
Payer: COMMERCIAL

## 2019-11-15 VITALS
HEART RATE: 64 BPM | SYSTOLIC BLOOD PRESSURE: 115 MMHG | HEIGHT: 70 IN | WEIGHT: 172 LBS | BODY MASS INDEX: 24.62 KG/M2 | DIASTOLIC BLOOD PRESSURE: 72 MMHG | RESPIRATION RATE: 16 BRPM

## 2019-11-15 DIAGNOSIS — E87.6 HYPOKALEMIA: ICD-10-CM

## 2019-11-15 DIAGNOSIS — Z72.0 TOBACCO ABUSE: ICD-10-CM

## 2019-11-15 DIAGNOSIS — E78.5 HYPERLIPIDEMIA, UNSPECIFIED HYPERLIPIDEMIA TYPE: ICD-10-CM

## 2019-11-15 DIAGNOSIS — R55 SYNCOPE AND COLLAPSE: Primary | ICD-10-CM

## 2019-11-15 PROCEDURE — 99406 BEHAV CHNG SMOKING 3-10 MIN: CPT | Performed by: INTERNAL MEDICINE

## 2019-11-15 PROCEDURE — 1111F DSCHRG MED/CURRENT MED MERGE: CPT | Performed by: INTERNAL MEDICINE

## 2019-11-15 PROCEDURE — 99495 TRANSJ CARE MGMT MOD F2F 14D: CPT | Performed by: INTERNAL MEDICINE

## 2019-11-15 RX ORDER — ATORVASTATIN CALCIUM 20 MG/1
20 TABLET, FILM COATED ORAL NIGHTLY
Qty: 30 TABLET | Refills: 5 | Status: SHIPPED | OUTPATIENT
Start: 2019-11-15

## 2019-11-15 RX ORDER — NICOTINE 21 MG/24HR
1 PATCH, TRANSDERMAL 24 HOURS TRANSDERMAL DAILY
Qty: 28 PATCH | Refills: 5 | Status: SHIPPED | OUTPATIENT
Start: 2019-11-15 | End: 2019-12-27

## 2019-11-15 NOTE — PATIENT INSTRUCTIONS
Problem List Items Addressed This Visit        Unprioritized    Hyperlipidemia     Recent lipid panel completed in the hospital shows significant elevation in the LDL cholesterol. He is restarted on atorvastatin at 20 mg daily.   Recheck labs have been req Refills on his nicotine patch at 14 mg daily has been provided. Will consider reducing to 7 at his next office visit.          Relevant Orders    BEHAV CHNG SMOKING GR THAN 3 UP TO 10 MIN                   Quitting Smoking    Quitting smoking is the most i

## 2019-11-15 NOTE — PROGRESS NOTES
Tobacco Cessation Documentation (Smoking and Smokeless included): I had an in depth therapy session with Nain Diaz about his tobacco use risks and options using the [employer]'s Five A's approach:    Ask: Rolando Pink is using tobacco products.   Asses treatments  ? specialists already aware of assumption/resumption of care  ? Education given to patient on self-management, independent living, and activities of daily living. ?  Referrals as listed below in orders Assisted in scheduling required follow-up etiology  EKG and troponin non-acute, some sinus bradycardia noted on tele  CT head and spine non-acute  MRI brain non-acute  2d echo with preserved EF, no WMA or valvular or septal abnormality  Carotid US negative  EEG negative  Neurology and cardiology w Wt 172 lb (78 kg)   BMI 24.68 kg/m²   Physical Exam   Nursing note and vitals reviewed. Constitutional: He is oriented to person, place, and time and thin. He appears well-developed. No distress. HENT:   Head: Normocephalic and atraumatic.    Right Ear: This Visit        Unprioritized    Hyperlipidemia     Recent lipid panel completed in the hospital shows significant elevation in the LDL cholesterol. He is restarted on atorvastatin at 20 mg daily. Recheck labs have been requested for the next 6 weeks. patch at 14 mg daily has been provided. Will consider reducing to 7 at his next office visit.          Relevant Orders    BEHAV CHNG SMOKING GR THAN 3 UP TO 10 MIN           Orders Placed This Encounter      Comp Metabolic Panel (14) [E]      Lipid Panel [

## 2019-11-15 NOTE — ASSESSMENT & PLAN NOTE
Potassium levels incidentally low at admit. This was corrected and at discharge looked normal.  Recheck labs ordered.

## 2019-11-15 NOTE — ASSESSMENT & PLAN NOTE
Sudden episode of ringing in his years, syncope which lasted for about 5 minutes at most.  Patient woke up without any confusion.   Had a contusion in the back of his head as well as his chest.  Work-up for syncope including EKG, echocardiogram, MRI of the

## 2019-11-15 NOTE — ASSESSMENT & PLAN NOTE
Refills on his nicotine patch at 14 mg daily has been provided. Will consider reducing to 7 at his next office visit.

## 2019-11-15 NOTE — ASSESSMENT & PLAN NOTE
Recent lipid panel completed in the hospital shows significant elevation in the LDL cholesterol. He is restarted on atorvastatin at 20 mg daily. Recheck labs have been requested for the next 6 weeks.

## 2019-12-12 RX ORDER — CELECOXIB 200 MG/1
200 CAPSULE ORAL DAILY
Refills: 0 | COMMUNITY
Start: 2019-09-16 | End: 2019-12-16

## 2019-12-12 RX ORDER — PREGABALIN 75 MG/1
75 CAPSULE ORAL 2 TIMES DAILY
Refills: 0 | COMMUNITY
Start: 2019-09-27 | End: 2019-12-16

## 2019-12-12 RX ORDER — ASPIRIN 81 MG/1
81 TABLET ORAL DAILY
COMMUNITY
Start: 2019-11-06

## 2019-12-12 RX ORDER — NICOTINE 21 MG/24HR
1 PATCH, TRANSDERMAL 24 HOURS TRANSDERMAL
COMMUNITY
Start: 2019-11-15

## 2019-12-12 RX ORDER — ATORVASTATIN CALCIUM 20 MG/1
20 TABLET, FILM COATED ORAL NIGHTLY
COMMUNITY
Start: 2019-11-15

## 2019-12-16 ENCOUNTER — OFFICE VISIT (OUTPATIENT)
Dept: CARDIOLOGY | Age: 59
End: 2019-12-16

## 2019-12-16 VITALS
DIASTOLIC BLOOD PRESSURE: 76 MMHG | HEART RATE: 65 BPM | SYSTOLIC BLOOD PRESSURE: 114 MMHG | WEIGHT: 175 LBS | OXYGEN SATURATION: 96 % | BODY MASS INDEX: 25.92 KG/M2 | HEIGHT: 69 IN

## 2019-12-16 DIAGNOSIS — R55 VASOVAGAL SYNCOPE: Primary | ICD-10-CM

## 2019-12-16 PROCEDURE — 99245 OFF/OP CONSLTJ NEW/EST HI 55: CPT | Performed by: INTERNAL MEDICINE

## 2019-12-16 ASSESSMENT — PATIENT HEALTH QUESTIONNAIRE - PHQ9
2. FEELING DOWN, DEPRESSED OR HOPELESS: NOT AT ALL
SUM OF ALL RESPONSES TO PHQ9 QUESTIONS 1 AND 2: 0
SUM OF ALL RESPONSES TO PHQ9 QUESTIONS 1 AND 2: 0
1. LITTLE INTEREST OR PLEASURE IN DOING THINGS: NOT AT ALL

## 2019-12-21 ENCOUNTER — APPOINTMENT (OUTPATIENT)
Dept: LAB | Facility: HOSPITAL | Age: 59
End: 2019-12-21
Attending: INTERNAL MEDICINE
Payer: COMMERCIAL

## 2019-12-21 DIAGNOSIS — E78.5 HYPERLIPIDEMIA, UNSPECIFIED HYPERLIPIDEMIA TYPE: ICD-10-CM

## 2019-12-21 PROCEDURE — 84443 ASSAY THYROID STIM HORMONE: CPT

## 2019-12-21 PROCEDURE — 80061 LIPID PANEL: CPT

## 2019-12-21 PROCEDURE — 80053 COMPREHEN METABOLIC PANEL: CPT

## 2019-12-21 PROCEDURE — 36415 COLL VENOUS BLD VENIPUNCTURE: CPT

## 2019-12-23 ENCOUNTER — OFFICE VISIT (OUTPATIENT)
Dept: NEUROLOGY | Facility: CLINIC | Age: 59
End: 2019-12-23
Payer: COMMERCIAL

## 2019-12-23 VITALS
DIASTOLIC BLOOD PRESSURE: 80 MMHG | HEIGHT: 69 IN | BODY MASS INDEX: 25.18 KG/M2 | WEIGHT: 170 LBS | SYSTOLIC BLOOD PRESSURE: 118 MMHG | HEART RATE: 68 BPM

## 2019-12-23 DIAGNOSIS — M54.2 NECK PAIN: ICD-10-CM

## 2019-12-23 DIAGNOSIS — R55 SYNCOPE AND COLLAPSE: Primary | ICD-10-CM

## 2019-12-23 PROCEDURE — 99214 OFFICE O/P EST MOD 30 MIN: CPT | Performed by: OTHER

## 2019-12-23 NOTE — PROGRESS NOTES
Neurology Follow up Visit     Referred By: Dr. Hooks ref. provider found    Chief Complaint: Patient presents with:  Syncope: Patient presents today stating the was hospitalized on 11/5/19 after an episode of syncope.  He states that he was at home when he f time.  Echocardiogram was not revealing. Doppler of carotids did not show any significant stenosis. Lipid panel still pending, the goal for LDL is below 70.      Patient came back for the follow-up in December 2019 or more syncopal events but he has been Mental Status- Alert and oriented x3.   Normal attention span and concentration  Thought process intact  Memory intact- recent and remote  Mood intact  Fund of knowledge appropriate for education and age    Language intact including: comprehension, mayte have independently reviewed imaging. MRI of the brain was independently reviewed, small encephalomalacia in the left side noted      Assessment   1. Syncope and collapse  Unclear cause. We did not start him on any seizure medications.   We will continue t

## 2019-12-26 ENCOUNTER — MED REC SCAN ONLY (OUTPATIENT)
Dept: INTERNAL MEDICINE CLINIC | Facility: CLINIC | Age: 59
End: 2019-12-26

## 2019-12-27 ENCOUNTER — OFFICE VISIT (OUTPATIENT)
Dept: INTERNAL MEDICINE CLINIC | Facility: CLINIC | Age: 59
End: 2019-12-27
Payer: COMMERCIAL

## 2019-12-27 VITALS
HEIGHT: 69 IN | HEART RATE: 80 BPM | RESPIRATION RATE: 16 BRPM | DIASTOLIC BLOOD PRESSURE: 74 MMHG | SYSTOLIC BLOOD PRESSURE: 110 MMHG | BODY MASS INDEX: 25.77 KG/M2 | WEIGHT: 174 LBS

## 2019-12-27 DIAGNOSIS — R55 SYNCOPE AND COLLAPSE: ICD-10-CM

## 2019-12-27 DIAGNOSIS — E78.5 HYPERLIPIDEMIA, UNSPECIFIED HYPERLIPIDEMIA TYPE: Primary | ICD-10-CM

## 2019-12-27 DIAGNOSIS — J43.2 CENTRILOBULAR EMPHYSEMA (HCC): ICD-10-CM

## 2019-12-27 PROCEDURE — 99214 OFFICE O/P EST MOD 30 MIN: CPT | Performed by: INTERNAL MEDICINE

## 2019-12-27 RX ORDER — NICOTINE 21 MG/24HR
1 PATCH, TRANSDERMAL 24 HOURS TRANSDERMAL DAILY
Qty: 28 PATCH | Refills: 5 | Status: SHIPPED | OUTPATIENT
Start: 2019-12-27

## 2019-12-28 NOTE — PROGRESS NOTES
HPI:    Patient ID: Pooja Ochoa is a 61year old male. His last ER visit for syncope and collapse he has not had any significant new episodes of blackouts.   He has had a complete work-up for syncope in the hospital.  He did complete labs after (had an extensive discusio about need to stop smoking,pt is willing to try chantix,). The symptoms are aggravated by smoking and stress. He has tried nothing for the symptoms. The treatment provided no relief.        Review of Systems   Constitutional: Jenn Benson reflexes. Skin: Skin is warm and dry. Nursing note and vitals reviewed.              ASSESSMENT/PLAN:     Problem List Items Addressed This Visit        Unprioritized    Hyperlipidemia - Primary     Lipid panel and liver function test have been stable o

## 2019-12-28 NOTE — ASSESSMENT & PLAN NOTE
Lipid panel and liver function test have been stable on atorvastatin at 20 mg daily. Continue on the same dose of medication.

## 2019-12-28 NOTE — ASSESSMENT & PLAN NOTE
1 episode of syncope and collapse which lasted for about 5 minutes. He did not have any confusion following the episode. He suffered a contusion to the back of his head.   Work-up for syncope including echo cardiogram, EKG, MRI of the brain, carotid Doppl

## 2019-12-28 NOTE — PATIENT INSTRUCTIONS
Problem List Items Addressed This Visit        Unprioritized    Centrilobular emphysema ([address]     Centrilobular emphysema with panlobular features in addition. He is a heavy smoker and has gradually made the commitment to quit.   He has cut back smoking to

## 2020-01-20 ENCOUNTER — OFFICE VISIT (OUTPATIENT)
Dept: PHYSICAL THERAPY | Age: 60
End: 2020-01-20
Attending: Other
Payer: COMMERCIAL

## 2020-01-20 DIAGNOSIS — M54.2 NECK PAIN: ICD-10-CM

## 2020-01-20 PROCEDURE — 97110 THERAPEUTIC EXERCISES: CPT | Performed by: PHYSICAL THERAPIST

## 2020-01-20 PROCEDURE — 97161 PT EVAL LOW COMPLEX 20 MIN: CPT | Performed by: PHYSICAL THERAPIST

## 2020-01-20 NOTE — PROGRESS NOTES
SPINE EVALUATION:   Referring Physician: Dr. Petar Soto  Diagnosis: neck pain, plantar fasciitis Date of Service: 1/20/2020     PATIENT SUMMARY   Liborio Jenkins is a 61year old male who presents to therapy today with complaints difficulty c R neck foot/LE pain during day c WB ADL/work requirements. Signs and symptoms are consistent with diagnosis of neck pain, R plantar fasciitis. Pt and PT discussed evaluation findings, pathology, POC and HEP.   Pt voiced understanding and performs HEP correctly wi stretch, thread the needle at wall, seated R heel cold pack application x 44-57 min     Charges: PT Eval Low Complexity, 1 there ex      Total Timed Treatment: 20 min     Total Treatment Time: 45 min     Based on clinical rationale and outcome measures, th To:4/19/2020

## 2020-01-22 ENCOUNTER — APPOINTMENT (OUTPATIENT)
Dept: PHYSICAL THERAPY | Age: 60
End: 2020-01-22
Attending: Other
Payer: COMMERCIAL

## 2020-01-27 ENCOUNTER — OFFICE VISIT (OUTPATIENT)
Dept: PHYSICAL THERAPY | Age: 60
End: 2020-01-27
Attending: Other
Payer: COMMERCIAL

## 2020-01-27 DIAGNOSIS — M54.2 NECK PAIN: ICD-10-CM

## 2020-01-27 PROCEDURE — 97140 MANUAL THERAPY 1/> REGIONS: CPT | Performed by: PHYSICAL THERAPIST

## 2020-01-27 PROCEDURE — 97110 THERAPEUTIC EXERCISES: CPT | Performed by: PHYSICAL THERAPIST

## 2020-01-27 NOTE — PROGRESS NOTES
Dx: neck pain, plantar fasciitis        Insurance (Authorized # of Visits):  6      Authorizing Physician: Dr. Zaira Gramajo  Next MD visit: none scheduled  Fall Risk: standard         Precautions: n/a             Subjective:  Foot much better; stretches really 15 sec off                       HEP review:gastroc stretch standing or using staircase, ankle PF/DF prior to transfer out of bed, seated thoracic/cerivcal spine ext/pec stretch, thread the needle at wall, seated R heel cold pack application x 36-85 min

## 2020-01-29 ENCOUNTER — APPOINTMENT (OUTPATIENT)
Dept: PHYSICAL THERAPY | Age: 60
End: 2020-01-29
Attending: Other
Payer: COMMERCIAL

## 2020-02-03 ENCOUNTER — APPOINTMENT (OUTPATIENT)
Dept: PHYSICAL THERAPY | Age: 60
End: 2020-02-03
Attending: Other
Payer: COMMERCIAL

## 2020-02-05 ENCOUNTER — OFFICE VISIT (OUTPATIENT)
Dept: PHYSICAL THERAPY | Age: 60
End: 2020-02-05
Attending: Other
Payer: COMMERCIAL

## 2020-02-05 DIAGNOSIS — M54.2 NECK PAIN: ICD-10-CM

## 2020-02-05 PROCEDURE — 97140 MANUAL THERAPY 1/> REGIONS: CPT | Performed by: PHYSICAL THERAPIST

## 2020-02-05 PROCEDURE — 97110 THERAPEUTIC EXERCISES: CPT | Performed by: PHYSICAL THERAPIST

## 2020-02-05 PROCEDURE — 97112 NEUROMUSCULAR REEDUCATION: CPT | Performed by: PHYSICAL THERAPIST

## 2020-02-05 NOTE — PROGRESS NOTES
Dx: neck pain, plantar fasciitis        Insurance (Authorized # of Visits):  6      Authorizing Physician: Dr. Vanita Fernando  Next MD visit: none scheduled  Fall Risk: standard         Precautions: n/a             Subjective: Haven't felt this good in years. nerve glides using staircase x 10 reps each L/R   Doorway pec stretch 2 x 30 sec each - not completed today  B row c black tubing 3 x 10 reps  B shoulder ER c red tubing 2 x 10 reps  S/L open book L/R x 10 reps each  Seated heel/toe raises x 20 reps   B ga

## 2020-02-10 ENCOUNTER — OFFICE VISIT (OUTPATIENT)
Dept: PHYSICAL THERAPY | Age: 60
End: 2020-02-10
Attending: Other
Payer: COMMERCIAL

## 2020-02-10 DIAGNOSIS — M54.2 NECK PAIN: ICD-10-CM

## 2020-02-10 PROCEDURE — 97140 MANUAL THERAPY 1/> REGIONS: CPT | Performed by: PHYSICAL THERAPIST

## 2020-02-10 PROCEDURE — 97110 THERAPEUTIC EXERCISES: CPT | Performed by: PHYSICAL THERAPIST

## 2020-02-10 NOTE — PROGRESS NOTES
Dx: neck pain, plantar fasciitis        Insurance (Authorized # of Visits):  6      Authorizing Physician: Dr. Tracy Smith  Next MD visit: none scheduled  Fall Risk: standard         Precautions: n/a             Subjective: 2/10 neck pain today.   Neck gettin 2 x 45 sec each   There Ex:   Supine shoulder AAROM flexion c wand 10 x 3 sec each  Standing L/R sciatic nerve glides using staircase x 10 reps each L/R   Doorway pec stretch 2 x 30 sec each - not completed today  B row c black tubing 3 x 10 reps  B should provided).     Charges: 2 there ex, 1 manualTotal Timed Treatment: 45 min  Total Treatment Time: 45 min

## 2020-02-12 ENCOUNTER — OFFICE VISIT (OUTPATIENT)
Dept: PHYSICAL THERAPY | Age: 60
End: 2020-02-12
Attending: Other
Payer: COMMERCIAL

## 2020-02-12 DIAGNOSIS — M54.2 NECK PAIN: ICD-10-CM

## 2020-02-12 PROCEDURE — 97110 THERAPEUTIC EXERCISES: CPT | Performed by: PHYSICAL THERAPIST

## 2020-02-12 PROCEDURE — 97140 MANUAL THERAPY 1/> REGIONS: CPT | Performed by: PHYSICAL THERAPIST

## 2020-02-12 PROCEDURE — 97014 ELECTRIC STIMULATION THERAPY: CPT | Performed by: PHYSICAL THERAPIST

## 2020-02-12 NOTE — PROGRESS NOTES
Dx: neck pain, plantar fasciitis        Insurance (Authorized # of Visits):  6      Authorizing Physician: Dr. Liam Newman  Next MD visit: none scheduled  Fall Risk: standard         Precautions: n/a             Subjective:  Increased neck pain starting yeste each   There Ex:   Supine shoulder AAROM flexion c wand 10 x 3 sec each  Standing L/R sciatic nerve glides using staircase x 10 reps each L/R   Doorway pec stretch 2 x 30 sec each - not completed today  B row c black tubing 3 x 10 reps  B shoulder ER c red x 15 sec on: ~ 15 sec off       NM Re-education:  Bodymechanics education:Lifting, push/pull, rotation; ask for help when appropriate  Pillow support for neutral, supported cervical spine  IFC R side of neck/R shoulder region x 10 min supine c bolster uner

## 2020-02-17 ENCOUNTER — APPOINTMENT (OUTPATIENT)
Dept: PHYSICAL THERAPY | Age: 60
End: 2020-02-17
Attending: Other
Payer: COMMERCIAL

## 2020-02-19 ENCOUNTER — OFFICE VISIT (OUTPATIENT)
Dept: PHYSICAL THERAPY | Age: 60
End: 2020-02-19
Attending: Other
Payer: COMMERCIAL

## 2020-02-19 DIAGNOSIS — M54.2 NECK PAIN: ICD-10-CM

## 2020-02-19 PROCEDURE — 97014 ELECTRIC STIMULATION THERAPY: CPT | Performed by: PHYSICAL THERAPIST

## 2020-02-19 PROCEDURE — 97110 THERAPEUTIC EXERCISES: CPT | Performed by: PHYSICAL THERAPIST

## 2020-02-19 PROCEDURE — 97140 MANUAL THERAPY 1/> REGIONS: CPT | Performed by: PHYSICAL THERAPIST

## 2020-02-19 NOTE — PROGRESS NOTES
3/9/2020: Patient last attended PT on 2/19/2020. He has cancelled remaining PT sessions. He has subsequently been self discharged from PT and is to f/u with a physician for any c/o/concerns.      Dx: neck pain, plantar fasciitis        Insurance (Authorize sec each  Standing L/R sciatic nerve glides using staircase x 10 reps each L/R  Doorway pec stretch 2 x 30 sec each  B row c black tubing 3 x 10 reps  B shoulder ER c red tubing 2 x 10 reps  S/L open book L/R x 10 reps each  Seated heel/toe raises x 30 rep on: ~ 15 sec off   Manual:   B pec stretch 3 x 15 sec each  STM B suboccipitals  Suboccipital release x 20 sec  Manual cervical spine traction ~ 5 x 15 sec on: ~ 15 sec off  STM R foot plantar fascia, calcaneus region Manual:   B pec stretch 3 x 15 sec eac

## 2020-05-06 ENCOUNTER — NURSE TRIAGE (OUTPATIENT)
Dept: INTERNAL MEDICINE CLINIC | Facility: CLINIC | Age: 60
End: 2020-05-06

## 2020-05-06 ENCOUNTER — TELEMEDICINE (OUTPATIENT)
Dept: INTERNAL MEDICINE CLINIC | Facility: CLINIC | Age: 60
End: 2020-05-06
Payer: COMMERCIAL

## 2020-05-06 DIAGNOSIS — R21 RASH: Primary | ICD-10-CM

## 2020-05-06 PROCEDURE — 99213 OFFICE O/P EST LOW 20 MIN: CPT | Performed by: INTERNAL MEDICINE

## 2020-05-06 RX ORDER — VALACYCLOVIR HYDROCHLORIDE 1 G/1
TABLET, FILM COATED ORAL
Qty: 20 TABLET | Refills: 0 | Status: SHIPPED | OUTPATIENT
Start: 2020-05-06

## 2020-05-06 RX ORDER — PREDNISONE 1 MG/1
TABLET ORAL
Qty: 15 TABLET | Refills: 0 | Status: SHIPPED | OUTPATIENT
Start: 2020-05-06

## 2020-05-06 NOTE — ASSESSMENT & PLAN NOTE
Erythematous rash with a few blisters–single lesion extending about 2 inches longitudinally on the right side. No extension into the back of the front of the chest.  Lesion noted right under the armpit. No other similar lesions in the body.   We will fritz

## 2020-05-06 NOTE — TELEPHONE ENCOUNTER
Action Requested: Summary for Provider     []  Critical Lab, Recommendations Needed  [] Need Additional Advice  []   FYI    []   Need Orders  [x] Need Medications Sent to Pharmacy  []  Other     SUMMARY: per patient's wife (on CASSANDRA) her  contracted s

## 2020-05-06 NOTE — PATIENT INSTRUCTIONS
Problem List Items Addressed This Visit        Unprioritized    Rash - Primary     Erythematous rash with a few blisters–single lesion extending about 2 inches longitudinally on the right side.   No extension into the back of the front of the chest.  Lesion

## 2020-05-06 NOTE — PROGRESS NOTES
HPI:    Patient ID: Patricia Garcia is a 61year old male.   Telehealth outside of [facility] [address] Verbal Consent   I conducted a telehealth visit with Patricia Garcia today, 05/06/20, which was completed using two-way, real-time interactive cold, not to scratch and advised it's contagious. Rash   This is a new problem. The current episode started yesterday. The problem is unchanged (Red slightly painful rash, noted incidentally on the right side of the chest wall–under the armpit.   No oth Mouth/Throat: Oropharynx is clear and moist.   Eyes: Pupils are equal, round, and reactive to light. Conjunctivae and EOM are normal. No scleral icterus. Cardiovascular: Normal rate. Pulmonary/Chest: Effort normal. No respiratory distress.  He has no

## 2020-07-15 ENCOUNTER — OFFICE VISIT (OUTPATIENT)
Dept: INTERNAL MEDICINE CLINIC | Facility: CLINIC | Age: 60
End: 2020-07-15
Payer: COMMERCIAL

## 2020-07-15 VITALS
SYSTOLIC BLOOD PRESSURE: 127 MMHG | DIASTOLIC BLOOD PRESSURE: 75 MMHG | WEIGHT: 179 LBS | HEART RATE: 72 BPM | BODY MASS INDEX: 26.51 KG/M2 | HEIGHT: 69 IN

## 2020-07-15 DIAGNOSIS — M79.89 PARASPINAL SOFT TISSUE MASS: ICD-10-CM

## 2020-07-15 DIAGNOSIS — Z72.0 TOBACCO ABUSE: Primary | ICD-10-CM

## 2020-07-15 PROBLEM — R05.9 COUGH: Status: RESOLVED | Noted: 2018-08-21 | Resolved: 2020-07-15

## 2020-07-15 PROBLEM — R21 RASH: Status: RESOLVED | Noted: 2020-05-06 | Resolved: 2020-07-15

## 2020-07-15 PROBLEM — E87.6 HYPOKALEMIA: Status: RESOLVED | Noted: 2019-11-15 | Resolved: 2020-07-15

## 2020-07-15 PROBLEM — R55 SYNCOPE AND COLLAPSE: Status: RESOLVED | Noted: 2019-11-05 | Resolved: 2020-07-15

## 2020-07-15 PROCEDURE — 3078F DIAST BP <80 MM HG: CPT | Performed by: INTERNAL MEDICINE

## 2020-07-15 PROCEDURE — 3074F SYST BP LT 130 MM HG: CPT | Performed by: INTERNAL MEDICINE

## 2020-07-15 PROCEDURE — 3008F BODY MASS INDEX DOCD: CPT | Performed by: INTERNAL MEDICINE

## 2020-07-15 PROCEDURE — 99213 OFFICE O/P EST LOW 20 MIN: CPT | Performed by: INTERNAL MEDICINE

## 2020-07-15 NOTE — ASSESSMENT & PLAN NOTE
CT of the soft tissue of the upper thoracic paraspinal region to the left of the midline requested. Referral for evaluation by surgery provided. We will follow-up after evaluation is completed.

## 2020-07-15 NOTE — PATIENT INSTRUCTIONS
Problem List Items Addressed This Visit        Unprioritized    Paraspinal soft tissue mass     CT of the soft tissue of the upper thoracic paraspinal region to the left of the midline requested. Referral for evaluation by surgery provided.   We kiley overton

## 2020-07-15 NOTE — PROGRESS NOTES
HPI:    Patient ID: Jayro Whipple is a 61year old male. Mass   This is a new problem. The current episode started more than 1 month ago. The problem occurs constantly. The problem has been unchanged. Associated symptoms include fatigue.  Pertine Musculoskeletal:        Arms:    Neurological: He is alert and oriented to person, place, and time. He has normal reflexes. Skin: Skin is warm and dry.               ASSESSMENT/PLAN:     Problem List Items Addressed This Visit        Susy Keen

## 2020-07-25 ENCOUNTER — HOSPITAL ENCOUNTER (OUTPATIENT)
Dept: CT IMAGING | Facility: HOSPITAL | Age: 60
Discharge: HOME | End: 2020-07-25
Attending: INTERNAL MEDICINE
Payer: COMMERCIAL

## 2020-07-25 DIAGNOSIS — Z72.0 TOBACCO ABUSE: ICD-10-CM

## 2020-07-30 ENCOUNTER — OFFICE VISIT (OUTPATIENT)
Dept: SURGERY | Facility: CLINIC | Age: 60
End: 2020-07-30
Payer: COMMERCIAL

## 2020-07-30 VITALS — HEIGHT: 69 IN | BODY MASS INDEX: 26.51 KG/M2 | WEIGHT: 179 LBS

## 2020-07-30 DIAGNOSIS — M79.89 SOFT TISSUE MASS: Primary | ICD-10-CM

## 2020-07-30 PROCEDURE — 99243 OFF/OP CNSLTJ NEW/EST LOW 30: CPT | Performed by: SURGERY

## 2020-07-30 PROCEDURE — 3008F BODY MASS INDEX DOCD: CPT | Performed by: SURGERY

## 2020-08-12 ENCOUNTER — OFFICE VISIT (OUTPATIENT)
Dept: SURGERY | Facility: CLINIC | Age: 60
End: 2020-08-12
Payer: COMMERCIAL

## 2020-08-12 VITALS — HEIGHT: 69 IN | BODY MASS INDEX: 26.51 KG/M2 | WEIGHT: 179 LBS

## 2020-08-12 DIAGNOSIS — M79.89 SOFT TISSUE MASS: ICD-10-CM

## 2020-08-12 DIAGNOSIS — R22.2 MASS OF SUBCUTANEOUS TISSUE OF BACK: Primary | ICD-10-CM

## 2020-08-12 PROCEDURE — 11106 INCAL BX SKN SINGLE LES: CPT | Performed by: SURGERY

## 2020-08-12 PROCEDURE — 3008F BODY MASS INDEX DOCD: CPT | Performed by: SURGERY

## 2020-08-12 PROCEDURE — 12032 INTMD RPR S/A/T/EXT 2.6-7.5: CPT | Performed by: SURGERY

## 2020-08-12 NOTE — H&P
Chief complaint: Patient presents with:  Lump: Referred by Dr. Sequeira Friend for mass, back which has been present for 4 mos. HPI: Everette Acosta presents for consult related to a soft tissue mass, enlarging, symptomatic. CT obtained, reviewed.     Past medical hi Never       Family history:  Family History   Problem Relation Age of Onset   • Cancer Father         Lung Cancer. Smoker.    • Eye Problems Mother         Hearing loss    • No Known Problems Son         Review of Systems:   GENERAL: feels generally well  S incisional     We have discussed the surgical risks, benefits, alternatives, and expected recovery. We will plan surgical excision under local anesthesia in the office setting. All of the patient's questions have been answered to his satisfaction.  biopsy

## 2020-08-12 NOTE — PROCEDURES
Procedure Note  The risks, benefits, alternatives and expected recovery were explained. The pt expressed understanding and wished to proceed with the procedure.       Preop: 10 cm indurated mass of back, L scapular area  Postop:  Same  Procedure:3.1 cm inc

## 2022-07-28 ENCOUNTER — APPOINTMENT (OUTPATIENT)
Dept: GENERAL RADIOLOGY | Age: 62
End: 2022-07-28
Payer: COMMERCIAL

## 2022-07-28 ENCOUNTER — APPOINTMENT (OUTPATIENT)
Dept: ULTRASOUND IMAGING | Age: 62
End: 2022-07-28
Payer: COMMERCIAL

## 2022-07-28 ENCOUNTER — HOSPITAL ENCOUNTER (OUTPATIENT)
Age: 62
Discharge: HOME | End: 2022-07-28
Payer: COMMERCIAL

## 2022-07-28 VITALS
TEMPERATURE: 98 F | BODY MASS INDEX: 22.9 KG/M2 | DIASTOLIC BLOOD PRESSURE: 79 MMHG | HEIGHT: 70 IN | WEIGHT: 160 LBS | RESPIRATION RATE: 16 BRPM | SYSTOLIC BLOOD PRESSURE: 134 MMHG | HEART RATE: 68 BPM | OXYGEN SATURATION: 98 %

## 2022-07-28 DIAGNOSIS — T14.8XXA MUSCLE STRAIN: ICD-10-CM

## 2022-07-28 DIAGNOSIS — M25.561 ACUTE PAIN OF RIGHT KNEE: Primary | ICD-10-CM

## 2022-07-28 DIAGNOSIS — M25.461 PAIN AND SWELLING OF KNEE, RIGHT: ICD-10-CM

## 2022-07-28 DIAGNOSIS — M25.561 PAIN AND SWELLING OF KNEE, RIGHT: ICD-10-CM

## 2022-07-28 PROCEDURE — 99203 OFFICE O/P NEW LOW 30 MIN: CPT

## 2022-07-28 PROCEDURE — 73560 X-RAY EXAM OF KNEE 1 OR 2: CPT

## 2022-07-28 PROCEDURE — 93971 EXTREMITY STUDY: CPT

## 2022-07-28 NOTE — ED INITIAL ASSESSMENT (HPI)
Pt presents to the IC with c/o right leg pain from calf to hip. States Monday he was moving a heavy object and felt something pull in his calf. Pt is ambulatory.

## 2023-01-30 ENCOUNTER — HOSPITAL ENCOUNTER (OUTPATIENT)
Age: 63
Discharge: HOME | End: 2023-01-30
Payer: COMMERCIAL

## 2023-01-30 VITALS
HEIGHT: 69 IN | BODY MASS INDEX: 23.7 KG/M2 | DIASTOLIC BLOOD PRESSURE: 81 MMHG | RESPIRATION RATE: 18 BRPM | TEMPERATURE: 97 F | HEART RATE: 91 BPM | WEIGHT: 160 LBS | OXYGEN SATURATION: 100 % | SYSTOLIC BLOOD PRESSURE: 148 MMHG

## 2023-01-30 DIAGNOSIS — R07.0 THROAT PAIN: Primary | ICD-10-CM

## 2023-01-30 LAB
S PYO AG THROAT QL: NEGATIVE
SARS-COV-2 RNA RESP QL NAA+PROBE: NOT DETECTED

## 2023-01-30 PROCEDURE — 87880 STREP A ASSAY W/OPTIC: CPT | Performed by: NURSE PRACTITIONER

## 2023-01-30 PROCEDURE — U0002 COVID-19 LAB TEST NON-CDC: HCPCS | Performed by: NURSE PRACTITIONER

## 2023-01-30 PROCEDURE — 99213 OFFICE O/P EST LOW 20 MIN: CPT | Performed by: NURSE PRACTITIONER

## 2023-01-30 NOTE — ED INITIAL ASSESSMENT (HPI)
Pt in [address] [place] [address] for c/o sore throat, chills, HA, and mild cough x Wednesday.

## 2023-01-30 NOTE — DISCHARGE INSTRUCTIONS
Strep test is negative. COVID test is negative. Symptoms appear viral in origin. They should resolve on their own in the next few days. You may use over-the-counter medications for symptom control -Tylenol, Motrin, salt water gargles, throat sprays and lozenges. Push fluids.   Follow-up with your primary doctor if no improvement Maxillary hypoplasia  corrective surgery done in 2011   No

## 2023-02-24 NOTE — ASSESSMENT & PLAN NOTE
Centrilobular emphysema with panlobular features in addition. He is a heavy smoker and has gradually made the commitment to quit. He has cut back smoking to about 4 to 5 cigarettes a day. He is on a patch–nicotine 14 mg per 24 hours/day.   He is advised On HD MWF. chronically on midodrine with HD due to hypotension. Last HD was on Wednesday 2/21/23.  - Renal consulted, appreciate recs  - Will need HD after CTA AP with IV contrast performed   - C/w Sensipar three times weekly On HD MWF. chronically on midodrine with HD due to hypotension. Last HD was on Wednesday 2/21/23.  - Renal consulted, appreciate recs  - Will need HD after CTA AP with IV contrast performed   - C/w Sensipar three times weekly  - f/u renal recs

## (undated) NOTE — ED AVS SNAPSHOT
[facility] Emergency Department    [address]     [place] [address]    Phone:  [phone]    Fax:  [address] [facility]   MRN: [id number]    Department:  [facility] Emergency Department   Date of Visit:  4/3 NECK SPASM, NO TRAUMA (ENGLISH)      Disclosure     Insurance plans vary and the physician(s) referred by the ER may not be covered by your plan.  Please contact your insurance company to determine coverage and benefits available for follow-up care and ref If you have been prescribed any medication(s), please fill your prescription right away and begin taking the medication(s) as directed.   If you believe that any of the medications or instructions on this list is different from what your Primary Care doctor - If you don’t have insurance, Rory Cid has partnered with [facility] to help you get signed up for insurance coverage.   [facility] is a Federal Navigator program that can help with your Affordable Care Act cover

## (undated) NOTE — LETTER
January 25, 2019 [address] [facility]  [address] [facility] [address]      Dear Kori Huizar:    The lung function tests show some changes due to smoking causing copd and reduced oxygen exchange in the lungs.    This is not severe however and as y

## (undated) NOTE — Clinical Note
FYI: TCM outreach completed. No HFU appt scheduled, pt declined. Sent TE to PCP office for assistance.

## (undated) NOTE — ED AVS SNAPSHOT
[facility] Emergency Department    [address]     [place] [address]    Phone:  [phone]    Fax:  [address] [facility]   MRN: [id number]    Department:  [facility] Emergency Department   Date of Visit:  4/3 and Class Registration line at [phone] or find a doctor online by visiting [email or web address].    IF THERE IS ANY CHANGE OR WORSENING OF YOUR CONDITION, CALL YOUR PRIMARY CARE PHYSICIAN AT ONCE OR RETURN IMMEDIATELY TO [address] [place] [address].     If

## (undated) NOTE — LETTER
Date & Time: 1/30/2023, 8:46 AM  Patient: Maddie Hernandez  Encounter Provider(s):    SAY Kerr       To Whom It May Concern:    Deandra Bird was seen and treated in our department on 1/30/2023. He can return to work on 2/2/23.     If you have any questions or concerns, please do not hesitate to call.        _____________________________  Physician/APC Signature